# Patient Record
Sex: FEMALE | NOT HISPANIC OR LATINO | URBAN - METROPOLITAN AREA
[De-identification: names, ages, dates, MRNs, and addresses within clinical notes are randomized per-mention and may not be internally consistent; named-entity substitution may affect disease eponyms.]

---

## 2023-11-30 ENCOUNTER — APPOINTMENT (RX ONLY)
Dept: URBAN - METROPOLITAN AREA CLINIC 80 | Facility: CLINIC | Age: 58
Setting detail: DERMATOLOGY
End: 2023-11-30

## 2023-11-30 DIAGNOSIS — L70.0 ACNE VULGARIS: ICD-10-CM

## 2023-11-30 PROCEDURE — ? PRESCRIPTION MEDICATION MANAGEMENT

## 2023-11-30 PROCEDURE — ? COUNSELING

## 2023-11-30 PROCEDURE — 99204 OFFICE O/P NEW MOD 45 MIN: CPT

## 2023-11-30 PROCEDURE — ? PRESCRIPTION

## 2023-11-30 RX ORDER — BENZOYL PEROXIDE 50 MG/G
1 CREAM TOPICAL QOHS
Qty: 30 | Refills: 5 | Status: ERX | COMMUNITY
Start: 2023-11-30

## 2023-11-30 RX ORDER — TRIFAROTENE 50 UG/G
1 CREAM TOPICAL BIW
Qty: 45 | Refills: 3 | Status: ERX | COMMUNITY
Start: 2023-11-30

## 2023-11-30 RX ORDER — DOXYCYCLINE 40 MG/1
1 CAPSULE ORAL QD
Qty: 30 | Refills: 11 | Status: ERX | COMMUNITY
Start: 2023-11-30

## 2023-11-30 RX ADMIN — BENZOYL PEROXIDE 1: 50 CREAM TOPICAL at 00:00

## 2023-11-30 RX ADMIN — TRIFAROTENE 1: 50 CREAM TOPICAL at 00:00

## 2023-11-30 RX ADMIN — DOXYCYCLINE 1: 40 CAPSULE ORAL at 00:00

## 2023-11-30 ASSESSMENT — LOCATION SIMPLE DESCRIPTION DERM: LOCATION SIMPLE: LEFT CHEEK

## 2023-11-30 ASSESSMENT — LOCATION DETAILED DESCRIPTION DERM: LOCATION DETAILED: LEFT INFERIOR CENTRAL MALAR CHEEK

## 2023-11-30 ASSESSMENT — LOCATION ZONE DERM: LOCATION ZONE: FACE

## 2023-11-30 NOTE — PROCEDURE: PRESCRIPTION MEDICATION MANAGEMENT
Render In Strict Bullet Format?: No
Initiate Treatment: Oracea 40mg once a day. Samples provided. \\nAklief, apply a pea sized amount to the entire face once to twice a week and increase as tolerated. \\nEpsolay cream, apply a pea sized amount to the entire face alternating with Aklief.
Detail Level: Zone

## 2023-11-30 NOTE — HPI: ACNE (PATIENT REPORTED)
Where Is Your Acne Located?: Face and around the mouth
List Over The Counter Products You Tried (Separate Each Name With A Comma):: Differin gel
List Over The Counter Products You Are Currently On (Separate Each Name With A Comma):: Dermalogica gentle cleanser.
Additional Comments (Use Complete Sentences): The patient states that she has tried Fabior foam and Differin gel that improved the symptoms but did not resolve it.